# Patient Record
Sex: FEMALE | Race: WHITE
[De-identification: names, ages, dates, MRNs, and addresses within clinical notes are randomized per-mention and may not be internally consistent; named-entity substitution may affect disease eponyms.]

---

## 2020-09-28 ENCOUNTER — HOSPITAL ENCOUNTER (OUTPATIENT)
Dept: HOSPITAL 63 - US | Age: 25
Discharge: HOME | End: 2020-09-28
Attending: FAMILY MEDICINE
Payer: COMMERCIAL

## 2020-09-28 DIAGNOSIS — R92.2: Primary | ICD-10-CM

## 2020-09-28 PROCEDURE — 76641 ULTRASOUND BREAST COMPLETE: CPT

## 2020-09-28 NOTE — RAD
Examination:

Limited left breast ultrasound.

 

INDICATION: 25-year-old woman with a tender lump in the left breast on 

self exam.

 

COMPARISON: None.

 

TECHNIQUE: Grayscale and color Doppler imaging of the patient's left 

breast in the area of palpable concern as reported on self exam was 

performed.

 

FINDINGS:

The area of patient's reported concern is at the 3:00 position 3 cm from 

the nipple and this reveals a ridge of dense fibrocystic tissue on 

ultrasound with no suspicious sonographic findings. Targeted ultrasound of

the left axilla subareolar breast shows a physiologically enlarged 2.9 cm 

long left axillary lymph node with no abnormal cortical thickening or 

hilar fat effacement.

 

IMPRESSION:

Negative targeted left breast ultrasound in the area of patient reported 

concern. Recommend clinical management which should include biopsy if 

there are any clinically suspicious findings. In the absence of any 

clinically suspicious findings, age-appropriate routine mammographic 

screening is recommended in addition to clinical management.

 

BI-RADS Category 1

Negative

 

Electronically signed by: Yazmin Allen MD (9/28/2020 5:37 PM) 

[id number]